# Patient Record
(demographics unavailable — no encounter records)

---

## 2024-11-08 NOTE — PHYSICAL EXAM
[de-identified] :  Lumbar Physical Exam   pitched forward posture, can correct with effort Gait - antalgic gait   Station - Normal   Sagittal balance - Normal   Compensatory mechanism? - None   unable to Heel walk and toe walk   Reflexes Patellar - normal Gastroc - normal Clonus - No   Hip Exam - Normal   Straight leg raise - none   Pulses - 2+ dp/pt   Range of motion - normal   Sensation Sensation intact to light touch in L1, L2, L3, L4, L5 and S1 dermatomes bilaterally   Motor IP Quad HS TA Gastroc EHL Right 4/5 5/5 5/5 5/5 5/5 5/5 Left 5/5 5/5 5/5 5/5 5/5 5/5 [de-identified] : Lumbar radiographs Hardware appears in poor position No obvious motion with flexion extension adjacent or subjacent to the patient's fusion

## 2024-11-08 NOTE — ASSESSMENT
[FreeTextEntry1] : I had a lengthy discussion with the patient in regards to their treatment plan and diagnosis.  They do have objective weakness findings on my exam.  Their symptoms have persisted despite the conservative management they have attempted thus far.  As a result I would like to proceed with a lumbar MRI.  In tandem with this they should begin a physical therapy/home therapy program and pool therapy.  The patient can take Tylenol/NSAIDs as needed for pain control if medically able to.  I will have the patient follow-up in 3 to 4 weeks for repeat clinical evaluation.  I encouraged them to follow-up sooner if their symptoms worsen or change in any way.

## 2024-11-08 NOTE — ASSESSMENT
[FreeTextEntry1] : Interim history The patient returns with pain that has been treated adequately in the past with epidural steroid injections.  The patient's complaints are consistent with radiculopathy. Patient's ADL's increase with prior JIM.   The last injection gave greater than 60% reduction of the pain but now there is a return of symptoms. The patient is requesting a subsequent epidural steroid injection to alleviate pain and improve functional ability and quality of life.  Patient is a candidate. Objective findings Since the last visit, there have been no new imaging studies. The patient has no new symptoms except the return of the their pain complaints. Motor and sensory function is unchanged. Plan Spoke to patient about epidural steroid injections. Explained risks, benefits and alternatives including but not limited to the risk of infection, bleeding, headache, syncopal episode, failure to resolve issues, allergic reaction, symptom recurrence, allergic reaction, nerve injury, and increased pain. The patient understands the risks. All questions were answered. The patient is willing to proceed.  This note was generated by using Dragon medical dictation software.  A reasonable effort has been made for proofreading its contents, but typos may still remain.  If there are any questions or points of clarification needed, please notify my office.

## 2024-11-08 NOTE — HISTORY OF PRESENT ILLNESS
[de-identified] : Patient is a 64 year old female who presents with pain in her back that radiates down to her LE and groin. She had surgery in 2016 with Dr. Harrington that resulted in leg pain. She had L4-L5 fusion surgery by Dr. Matias. Patient was in the hospital for pain in her arm. After rest, she has had constant pain in her back that radiates. Moving heavy objects exacerbates this pain. Patient has discontinued physical therapy. Patient takes Gabapentin 600 mg 1x day and diazepam that provides some symptomatic relief.

## 2024-11-08 NOTE — ADDENDUM
[FreeTextEntry1] :  I, Esther Hubbard, acted solely as a scribe for Dr. Chas Xiong MD on this date 11/08/2024    All medical record entries made by the Scribe were at my, Dr. Chas Xiong MD., direction and personally dictated by me on 11/08/2024 . I have reviewed the chart and agree that the record accurately reflects my personal performance of the history, physical exam, assessment and plan. I have also personally directed, reviewed, and agreed with the chart.

## 2024-11-08 NOTE — HISTORY OF PRESENT ILLNESS
[Lower back] : lower back [Gradual] : gradual [Burning] : burning [Dull/Aching] : dull/aching [Radiating] : radiating [Shooting] : shooting [Stabbing] : stabbing [Constant] : constant [Household chores] : household chores [Work] : work [Sleep] : sleep [Rest] : rest [Meds] : meds [Ice] : ice [Sitting] : sitting [Standing] : standing [Walking] : walking [Bending forward] : bending forward [Disabled] : Work status: disabled [Home] : at home, [unfilled] , at the time of the visit. [Medical Office: ([facility])___] : at the medical office located in  [Verbal consent obtained from patient] : the patient, [unfilled] [FreeTextEntry1] : ELVIA ACEVEDO IS FOLLOWING UP FOR PAIN MED REFILL,  LAST VISIT.    LAST UDS 10.11.2024 PAIN LEV: 10/10 [] : This patient has had an injection before: no [FreeTextEntry6] : NUMBNESS IN FEET WHILE ASLEEP  [FreeTextEntry7] : SHOOTING PAIN DOWN BOTH LEGS  [de-identified] : 2016/2020 [de-identified] : PT WOULD LIKE A PHYSICAL THERAPY RX

## 2024-12-06 NOTE — HISTORY OF PRESENT ILLNESS
[de-identified] : Patient is a 64 year old female who presents with pain in her back that radiates down to her LE and groin. She is here to review her MRI results. She endorses to radiating bilateral LE pain, which is worse on the left. She reports she had her gall bladder removed.  11.08.24 Patient is a 64 year old female who presents with pain in her back that radiates down to her LE and groin. She had surgery in 2016 with Dr. Harrington that resulted in leg pain. She had L4-L5 fusion surgery by Dr. Matias. Patient was in the hospital for pain in her arm. After rest, she has had constant pain in her back that radiates. Moving heavy objects exacerbates this pain. Patient has discontinued physical therapy. Patient takes Gabapentin 600 mg 1x day and diazepam that provides some symptomatic relief.

## 2024-12-06 NOTE — ASSESSMENT
[FreeTextEntry1] :  I had a lengthy discussion with the patient in regards to treatment plan and diagnosis. There are no red flag findings on imaging nor are there any red flag findings on clinical exams.  Therefore we will proceed with a course of conservative treatment. This would include physical therapy/home exercise program, Tylenol, NSAIDs as medically indicated.  She should continue working with Dr. Butler. The patient will follow up with me in a few months.  I encouraged the patient to follow-up sooner if there are any new or worsening symptoms.

## 2024-12-06 NOTE — PHYSICAL EXAM
[de-identified] :  Lumbar Physical Exam   Gait - Normal   Station - Normal   Sagittal balance - Normal   Compensatory mechanism? - None    Reflexes Patellar - normal Gastroc - normal Clonus - No   Hip Exam - Normal   Straight leg raise - none   Pulses - 2+ dp/pt   Range of motion - normal   Sensation Sensation intact to light touch in L1, L2, L3, L4, L5 and S1 dermatomes bilaterally   Motor IP Quad HS TA Gastroc EHL Right 5/5 5/5 5/5 5/5 5/5 5/5 Left 5/5 5/5 5/5 5/5 5/5 5/5 [de-identified] : Lumbar MRI no critical areas of tightness over nerve roots  Lumbar radiographs Hardware appears in poor position No obvious motion with flexion extension adjacent or subjacent to the patient's fusion

## 2024-12-09 NOTE — HISTORY OF PRESENT ILLNESS
[Lower back] : lower back [Gradual] : gradual [8] : 8 [6] : 6 [Burning] : burning [Dull/Aching] : dull/aching [Radiating] : radiating [Shooting] : shooting [Stabbing] : stabbing [Constant] : constant [Household chores] : household chores [Work] : work [Sleep] : sleep [Rest] : rest [Meds] : meds [Ice] : ice [Sitting] : sitting [Standing] : standing [Walking] : walking [Bending forward] : bending forward [Disabled] : Work status: disabled [FreeTextEntry1] : States she is starting Physical therapy . Follows up with[facility]Virk. Pain meds effective prn. States. her Fibromyalgia and other health issues have been exacerbated recently.  Maintains a functional ADL. [] : This patient has had an injection before: no [FreeTextEntry6] : NUMBNESS IN FEET WHILE ASLEEP  [FreeTextEntry7] : SHOOTING PAIN DOWN BOTH LEGS  [de-identified] : 2016/2020 [de-identified] : PT WOULD LIKE A PHYSICAL THERAPY RX

## 2024-12-09 NOTE — REASON FOR VISIT
[Home] : at home, [unfilled] , at the time of the visit. [Medical Office: ([facility])___] : at the medical office located in  [Patient] : the patient [Self] : self [Follow-Up Visit] : a follow-up pain management visit

## 2024-12-09 NOTE — DISCUSSION/SUMMARY
[Medication Risks Reviewed] : Medication risks reviewed [de-identified] : Prescriptions renewed. Opioid agreement/obtained on chart [place]  reviewed and appropriate. SOAPP-R completed on chart. The patient's medications are documented to the best of their ability. Quality of life and functional ability improved on medications. The patient is showing no aberrant behavior or evidence of diversion. The patient was advised not to use narcotic medication while operating an automobile or heavy machinery due to potential sedation or dizziness. The patient was educated to the risks associated with potential opioid dependence and addiction. Urine toxicology screens as per office protocol. Use of multimodal analgesia used prn. Follow up one month.

## 2025-01-13 NOTE — HISTORY OF PRESENT ILLNESS
[Lower back] : lower back [Gradual] : gradual [Burning] : burning [Dull/Aching] : dull/aching [Radiating] : radiating [Shooting] : shooting [Stabbing] : stabbing [Constant] : constant [Household chores] : household chores [Work] : work [Sleep] : sleep [Rest] : rest [Meds] : meds [Ice] : ice [Sitting] : sitting [Standing] : standing [Walking] : walking [Bending forward] : bending forward [Disabled] : Work status: disabled [Home] : at home, [unfilled] , at the time of the visit. [Medical Office: ([facility])___] : at the medical office located in  [Verbal consent obtained from patient] : the patient, [unfilled] [FreeTextEntry1] : ELVIA ACEVEDO IS FOLLOWING UP FOR PAIN MED REFILL,  LAST VISIT.    LAST UDS 10.11.2024 PAIN LEV: 10/10 [] : This patient has had an injection before: no [FreeTextEntry6] : NUMBNESS IN FEET WHILE ASLEEP  [FreeTextEntry7] : SHOOTING PAIN DOWN BOTH LEGS  [de-identified] : 2016/2020 [de-identified] : PT WOULD LIKE A PHYSICAL THERAPY RX

## 2025-02-05 NOTE — HISTORY OF PRESENT ILLNESS
[FreeTextEntry1] : 63 yo female, s/p ccy 2 years ago, with RUQ pain; went to [facility] and had cat scan mri and pt states were "normal." Hx of back pain, uses Dilaudid for pain; States has Fibromyalgia and LS stenosis. REcent alt, ast normal and alp normal ( reviewed.  hx of diverticulosis, and has bms daily. Has had two spinal fusions.  [facility] SHe is seeing a pain mgmt MD and would like to d/c Dilaudid. Her ruq pain predated and post dated her CCY. Pathology reviewed which demonstrated cholelithiasis and chronic cholecystitis.She is frustrated with constant pain and inability to lose weight.

## 2025-02-05 NOTE — PHYSICAL EXAM
[Alert] : alert [Normal Voice/Communication] : normal voice/communication [No Acute Distress] : no acute distress [Obese (BMI >= 30)] : obese (BMI >= 30) [Sclera] : the sclera and conjunctiva were normal [Hearing Threshold Finger Rub Not [place]] : hearing was normal [Normal Lips/Gums] : the lips and gums were normal [Oropharynx] : the oropharynx was normal [Normal Appearance] : the appearance of the neck was normal [No Neck Mass] : no neck mass was observed [No Respiratory Distress] : no respiratory distress [No Acc Muscle Use] : no accessory muscle use [Respiration, Rhythm And Depth] : normal respiratory rhythm and effort [Auscultation Breath Sounds / Voice Sounds] : lungs were clear to auscultation bilaterally [Heart Rate And Rhythm] : heart rate was normal and rhythm regular [Normal S1, S2] : normal S1 and S2 [Murmurs] : no murmurs [Bowel Sounds] : normal bowel sounds [Abdomen Tenderness] : non-tender [No Masses] : no abdominal mass palpated [Abdomen Soft] : soft [] : no hepatosplenomegaly [Oriented To Time, Place, And Person] : oriented to person, place, and time

## 2025-02-05 NOTE — ASSESSMENT
[FreeTextEntry1] : 63 yo female, s/p ccy 2 years ago, with RUQ pain; went to [facility] and had cat scan mri and pt states were "normal." Hx of back pain, uses Dilaudid for pain; States has Fibromyalgia and LS stenosis. REcent alt, ast normal and alp normal ( reviewed.  hx of diverticulosis, and has bms daily. Has had two spinal fusions.  [facility] SHe is seeing a pain mgmt MD and would like to d/c Dilaudid. Her ruq pain predated and post dated her CCY. Pathology reviewed which demonstrated cholelithiasis and chronic cholecystitis.She is frustrated with constant pain and inability to lose weight.  IMP: 1. chronic RUQ pain, possible due to MSK issues, vs CBD Stone (doubt) 2. colon cancer screening average risk 3. Fibromyalgia 4. Obesity 5. Hx of breast ca  PLAN: 1. Records request from [facility] for imaging 2. She  was advised to undergo endoscopy to which she agreed. The procedure will be performed in [facility] with the assistance of an anesthesiologist. The procedure was explained in detail and she understood the risks of the procedure not limited  to infection, bleeding, perforation, risk of anesthesia and risk of IV site problems,emergency surgery, missed lesions  or non-diagnosis of stomach or esophageal  cancer.He/She]  was advised that she could not drive home alone, if the patient chooses to receive sedation. Further diagnostic and treatment recommendations will be based upon the procedure and any biopsies, if they are taken. 3. She was advised to undergo colonoscopy to which she  agreed. The procedure will be performed in [facility] with the assistance of an anesthesiologist. The procedure was explained in detail and she understood the risks of the procedure not limited  to infection, bleeding, perforation, risk of anesthesia and risk of IV site problems,emergency surgery, missed lesions  or non-diagnosis of colon cancer. She  was advised that she could not drive home alone, if the patient chooses to receive sedation. Further diagnostic and treatment recommendations will be based upon the procedure and any biopsies, if they are taken.

## 2025-02-05 NOTE — REVIEW OF SYSTEMS
[Abdominal Pain] : abdominal pain [As Noted in HPI] : as noted in HPI [Negative] : Heme/Lymph [FreeTextEntry9] : joint pain

## 2025-02-10 NOTE — REASON FOR VISIT
[Follow-Up Visit] : a follow-up pain management visit [Home] : at home, [unfilled] , at the time of the visit. [Medical Office: ([facility])___] : at the medical office located in  [Telehealth (audio & video)] : This visit was provided via telehealth using real-time 2-way audio visual technology.

## 2025-02-10 NOTE — HISTORY OF PRESENT ILLNESS
[Lower back] : lower back [8] : 8 [Dull/Aching] : dull/aching [Constant] : constant [Household chores] : household chores [Sleep] : sleep [Meds] : meds [Ice] : ice [Physical therapy] : physical therapy [Disabled] : Work status: disabled [FreeTextEntry1] : States low back pain varies. recently hospitalized for GI issues and continues MD follow up. Pain meds effective prn, Her goal is to slowly decrease. Staes. Lidoderm patches helpful in the past and will order pending insurance approval.  Maintains a functional ADL. [] : no [de-identified] : HEAT [de-identified] : 2015,2020 [de-identified] : YEARS AGO

## 2025-02-10 NOTE — DISCUSSION/SUMMARY
[Medication Risks Reviewed] : Medication risks reviewed [de-identified] : Prescriptions renewed. Opioid agreement/obtained on chart [place]  reviewed and appropriate. SOAPP-R completed on chart. The patient's medications are documented to the best of their ability. Quality of life and functional ability improved on medications. The patient is showing no aberrant behavior or evidence of diversion. The patient was advised not to use narcotic medication while operating an automobile or heavy machinery due to potential sedation or dizziness. The patient was educated to the risks associated with potential opioid dependence and addiction. Urine toxicology screens as per office protocol. Use of multimodal analgesia used prn. Follow up one month.

## 2025-03-03 NOTE — HISTORY OF PRESENT ILLNESS
[Lower back] : lower back [Gradual] : gradual [7] : 7 [6] : 6 [Burning] : burning [Dull/Aching] : dull/aching [Radiating] : radiating [Shooting] : shooting [Stabbing] : stabbing [Constant] : constant [Household chores] : household chores [Work] : work [Sleep] : sleep [Rest] : rest [Meds] : meds [Ice] : ice [Sitting] : sitting [Standing] : standing [Walking] : walking [Bending forward] : bending forward [Disabled] : Work status: disabled [Home] : at home, [unfilled] , at the time of the visit. [Medical Office: ([facility])___] : at the medical office located in  [Telehealth (audio & video)] : This visit was provided via telehealth using real-time 2-way audio visual technology. [Verbal consent obtained from patient] : the patient, [unfilled] [FreeTextEntry1] : ELVIA ACEVEDO IS FOLLOWING UP FOR PHYSICAL THERAPY SCRIPT FOR LSPINE PAIN AS PREV PT HAS BEEN HELPING WITH PAIN, MED REFILL  [] : This patient has had an injection before: no [FreeTextEntry6] : NUMBNESS IN FEET WHILE ASLEEP  [FreeTextEntry7] : SHOOTING PAIN DOWN BOTH LEGS  [de-identified] : 2016/2020 [de-identified] : 12/ [de-identified] : PT WOULD LIKE A PHYSICAL THERAPY RX

## 2025-03-19 NOTE — ASSESSMENT
[FreeTextEntry1] : 63 y/o F with multiple medical comorbidities including chronic pain prmarily being managed by Dr. Butler, on chronic opioids, no with acute exacerbation of chronic neck and right shoulder pain.  Patient has seen multiple providers including Ortho. W/u thus far includes MRI of shoulder and C spine not available at the time of the visit for review.  On exam B/L trap and Cervical paraspinal muscle tenderness with spasm and trigger points, L>R , no evidence of myelopathy.   Discussed in detail the nature of chronic pain as well as treatment options including nonopioid pain management PT, therapeutic massage, stretching, exercise program, acupuncture, CBT as well as topical medications, non-opioid pain medications, Trigger point injections.   -TPI performed today without AE  -Continue topicals, therapeutic massage, stretching,  PT after imaging reviewed.  -Continue current meds   The patient had the opportunity to ask questions and all were answered to their satisfaction.  The patient verbalized understanding of the management plan and agreed with our recommendations.

## 2025-03-19 NOTE — PHYSICAL EXAM
[General Appearance - Alert] : alert [General Appearance - In No Acute Distress] : in no acute distress [Oriented To Time, Place, And Person] : oriented to person, place, and time [Impaired Insight] : insight and judgment were intact [Affect] : the affect was normal [Person] : oriented to person [Place] : oriented to place [Time] : oriented to time [Current Events] : adequate knowledge of current events [Cranial Nerves Optic (II)] : visual acuity intact bilaterally,  visual fields full to confrontation, pupils equal round and reactive to light [Cranial Nerves Oculomotor (III)] : extraocular motion intact [Cranial Nerves Trigeminal (V)] : facial sensation intact symmetrically [Cranial Nerves Facial (VII)] : face symmetrical [Cranial Nerves Vestibulocochlear (VIII)] : hearing was intact bilaterally [Cranial Nerves Glossopharyngeal (IX)] : tongue and palate midline [Cranial Nerves Accessory (XI - Cranial And Spinal)] : head turning and shoulder shrug symmetric [Motor Tone] : muscle tone was normal in all four extremities [Motor Handedness Right-Handed] : the patient is right hand dominant [Sensation Tactile Decrease] : light touch was intact [Sensation Pain / Temperature Decrease] : pain and temperature was intact [2+] : Brachioradialis left 2+ [1+] : Ankle jerk left 1+ [Sclera] : the sclera and conjunctiva were normal [PERRL With Normal Accommodation] : pupils were equal in size, round, reactive to light, with normal accommodation [Neck Appearance] : the appearance of the neck was normal [] : no respiratory distress [Heart Rate And Rhythm] : heart rate was normal and rhythm regular [No CVA Tenderness] : no ~M costovertebral angle tenderness [Motor Strength Upper Extremities Bilaterally] : strength was normal in both upper extremities [Motor Strength Lower Extremities Bilaterally] : strength was normal in both lower extremities [Romberg's Sign] : Romberg's sign was negtive [Allodynia] : no ~T allodynia present [Dysesthesia] : no dysesthesia [FreeTextEntry1] : ambulating with SAC

## 2025-03-19 NOTE — PROCEDURE
[FreeTextEntry1] : The procedure risks, hazards and alternatives were discussed with the patient and appropriate consent was obtained. The area over the myofascial spasm / pain was prepped with alcohol utilizing sterile technique. After isolating it between two palpating fingertips a 27 gauge 1.5 needle was placed in the center of the myofascial spasm and a negative aspiration was performed. A total of 4 mL of 1% Lidocaine mixed with Kenalog 20 mg was injected into 4 sites Left traps. The patient tolerated procedure well without any apparent difficulties or complications.  Kenalog [id number] , 9/2026 , Lidocaine [id number]  06/30/2026   ELVIA ACEVEDO was monitored in the office for 5 minutes after injections and tolerated procedure well without adverse events.

## 2025-03-19 NOTE — HISTORY OF PRESENT ILLNESS
[FreeTextEntry1] : ELVIA ACEVEDO is a 64 year old female with a Pmhx of trigeminal neuralgia s/p surgical decompression, fibromyalgia, AVN R hips s/p R MARIAA 2008, lumbar stenosis with radiculopathy s/p laminectomy L5-S1 2015, revision L4 fusion 2020 who presents for evaluation of chronic pain in left shoulder onset in March 2024 which progressively worsened over time 10/10, worsened with cold movement, activity. She was seen in ER at [place] MRI was done.  She saw Dr. Jamie Louiehealth who performed steroid injection which helped x 8 months. She additionally had PT which she feels was helpful.  Approximately 6 weeks ago pain returned and saw Ortho at [facility] who performed injection which helped. Pain in left shoulder is now constant radiating to left forearm, occasionally to hand, + numbness and tingling at times.  APAP prn, helps, HEP.   Allergies: Multiple  Prior medications: Current medications : hydromorphone 4 mg 4x/day, Gabapentin 300 mg 2x/day, diazepam 5 mg, APAP 1500-2000mg daily   Social Hx: She is  and lives in [place] 2 children and 2 grandchildren.  Denies smoking, drinking, illicits.

## 2025-03-19 NOTE — HISTORY OF PRESENT ILLNESS
[FreeTextEntry1] : ELVIA ACEVEDO is a 64 year old female with a Pmhx of trigeminal neuralgia s/p surgical decompression, fibromyalgia, AVN R hips s/p R MARIAA 2008, lumbar stenosis with radiculopathy s/p laminectomy L5-S1 2015, revision L4 fusion 2020 who presents for evaluation of chronic pain in left shoulder onset in March 2024 which progressively worsened over time 10/10, worsened with cold movement, activity. She was seen in ER at [place] MRI was done.  She saw Dr. Jamie Louiehealth who performed steroid injection which helped x 8 months. She additionally had PT which she feels was helpful.  Approximately 6 weeks ago pain returned and saw Ortho at [facility] who performed injection which helped. Pain in left shoulder is now constant radiating to left forearm, occasionally to hand, + numbness and tingling at times.  APAP prn, helps, HEP.   Allergies: Multiple  Prior medications: Current medications : hydromorphone 4 mg 4x/day, Gabapentin 300 mg 2x/day, diazepam 5 mg, APAP 1500-2000mg daily   Social Hx: She is  and lives in [place] 2 children and 2 grandchildren.  Denies smoking, drinking, illicits.

## 2025-03-19 NOTE — PROCEDURE
[FreeTextEntry1] : The procedure risks, hazards and alternatives were discussed with the patient and appropriate consent was obtained. The area over the myofascial spasm / pain was prepped with alcohol utilizing sterile technique. After isolating it between two palpating fingertips a 27 gauge 1.5 needle was placed in the center of the myofascial spasm and a negative aspiration was performed. A total of 4 mL of 1% Lidocaine mixed with Kenalog 20 mg was injected into 4 sites Left traps. The patient tolerated procedure well without any apparent difficulties or complications.  Kenalog [id number] , 9/2026 , Lidocaine [id number]  06/30/2026   ELVIA ACEVEDO was monitored in the office for 5 minutes after injections and tolerated procedure well without adverse events.

## 2025-04-29 NOTE — DISCUSSION/SUMMARY
[Medication Risks Reviewed] : Medication risks reviewed [de-identified] : Prescriptions renewed. Opioid agreement/obtained on chart [place]  reviewed and appropriate. SOAPP-R completed on chart. The patient's medications are documented to the best of their ability. Quality of life and functional ability improved on medications. The patient is showing no aberrant behavior or evidence of diversion. The patient was advised not to use narcotic medication while operating an automobile or heavy machinery due to potential sedation or dizziness. The patient was educated to the risks associated with potential opioid dependence and addiction. Urine toxicology screens as per office protocol. Use of multimodal analgesia used prn. Follow up one month.

## 2025-04-29 NOTE — HISTORY OF PRESENT ILLNESS
[Lower back] : lower back [Gradual] : gradual [8] : 8 [6] : 6 [Burning] : burning [Dull/Aching] : dull/aching [Radiating] : radiating [Shooting] : shooting [Stabbing] : stabbing [Constant] : constant [Household chores] : household chores [Work] : work [Sleep] : sleep [Rest] : rest [Meds] : meds [Ice] : ice [Sitting] : sitting [Standing] : standing [Walking] : walking [Bending forward] : bending forward [Disabled] : Work status: disabled [FreeTextEntry1] : Low back pain varies. Continues Physical therapy. Pain meds effective prn. Dealing with left shoulder issues.  [] : This patient has had an injection before: no [FreeTextEntry6] : NUMBNESS IN FEET WHILE ASLEEP  [FreeTextEntry7] : SHOOTING PAIN DOWN BOTH LEGS  [de-identified] : 2016/2020 [de-identified] : PT WOULD LIKE A PHYSICAL THERAPY RX

## 2025-05-30 NOTE — DISCUSSION/SUMMARY
[Medication Risks Reviewed] : Medication risks reviewed [de-identified] : Prescriptions renewed. Opioid agreement/obtained on chart [place]  reviewed and appropriate. SOAPP-R completed on chart. The patient's medications are documented to the best of their ability. Quality of life and functional ability improved on medications. The patient is showing no aberrant behavior or evidence of diversion. The patient was advised not to use narcotic medication while operating an automobile or heavy machinery due to potential sedation or dizziness. The patient was educated to the risks associated with potential opioid dependence and addiction. Urine toxicology screens as per office protocol. Use of multimodal analgesia used prn. Follow up one month.

## 2025-05-30 NOTE — HISTORY OF PRESENT ILLNESS
[Lower back] : lower back [Gradual] : gradual [8] : 8 [6] : 6 [Burning] : burning [Dull/Aching] : dull/aching [Radiating] : radiating [Shooting] : shooting [Stabbing] : stabbing [Constant] : constant [Household chores] : household chores [Work] : work [Sleep] : sleep [Rest] : rest [Meds] : meds [Ice] : ice [Sitting] : sitting [Standing] : standing [Walking] : walking [Bending forward] : bending forward [Disabled] : Work status: disabled [FreeTextEntry1] : Chronic. back pain stable. Pain meds effective prn. States physical therapy has been very effective. for her.  Maintains a functional ADL. [] : This patient has had an injection before: no [FreeTextEntry6] : NUMBNESS IN FEET WHILE ASLEEP  [FreeTextEntry7] : SHOOTING PAIN DOWN BOTH LEGS  [de-identified] : 2016/2020 [de-identified] : PT WOULD LIKE A PHYSICAL THERAPY RX

## 2025-05-30 NOTE — REASON FOR VISIT
[Home] : at home, [unfilled] , at the time of the visit. [Medical Office: ([facility])___] : at the medical office located in  [Telehealth (audio & video)] : This visit was provided via telehealth using real-time 2-way audio visual technology. [Follow-Up Visit] : a follow-up pain management visit

## 2025-06-27 NOTE — HISTORY OF PRESENT ILLNESS
[Lower back] : lower back [Gradual] : gradual [8] : 8 [6] : 6 [Burning] : burning [Dull/Aching] : dull/aching [Radiating] : radiating [Shooting] : shooting [Stabbing] : stabbing [Constant] : constant [Household chores] : household chores [Work] : work [Sleep] : sleep [Rest] : rest [Meds] : meds [Ice] : ice [Sitting] : sitting [Standing] : standing [Walking] : walking [Bending forward] : bending forward [Disabled] : Work status: disabled [Home] : at home, [unfilled] , at the time of the visit. [Medical Office: ([facility])___] : at the medical office located in  [Telehealth (audio & video)] : This visit was provided via telehealth using real-time 2-way audio visual technology. [Verbal consent obtained from patient] : the patient, [unfilled] [FreeTextEntry1] : ELVIA CURTIS IS FOLLOWING UP FOR PAIN MED REFILL, THERE HAS BEEN NO CHANGES IN PAIN SINCE LAST VISIT.   LAST UDS: 4/29/25 [] : This patient has had an injection before: no [FreeTextEntry6] : NUMBNESS IN FEET WHILE ASLEEP  [FreeTextEntry7] : SHOOTING PAIN DOWN BOTH LEGS  [de-identified] : 2016/2020 [de-identified] : PT WOULD LIKE A PHYSICAL THERAPY RX

## 2025-07-23 NOTE — HISTORY OF PRESENT ILLNESS
[Lower back] : lower back [10] : 10 [Dull/Aching] : dull/aching [Constant] : constant [Household chores] : household chores [Sleep] : sleep [Meds] : meds [Ice] : ice [Physical therapy] : physical therapy [Disabled] : Work status: disabled [Home] : at home, [unfilled] , at the time of the visit. [Medical Office: ([facility])___] : at the medical office located in  [Telehealth (audio & video)] : This visit was provided via telehealth using real-time 2-way audio visual technology. [Verbal consent obtained from patient] : the patient, [unfilled] [] : no [de-identified] : HEAT [de-identified] : 2015,2020 [de-identified] : YEARS AGO